# Patient Record
Sex: MALE | Race: WHITE | NOT HISPANIC OR LATINO | Employment: FULL TIME | URBAN - METROPOLITAN AREA
[De-identification: names, ages, dates, MRNs, and addresses within clinical notes are randomized per-mention and may not be internally consistent; named-entity substitution may affect disease eponyms.]

---

## 2017-01-24 ENCOUNTER — OFFICE VISIT (OUTPATIENT)
Dept: INTERNAL MEDICINE | Facility: CLINIC | Age: 32
End: 2017-01-24

## 2017-01-24 VITALS
HEART RATE: 83 BPM | SYSTOLIC BLOOD PRESSURE: 128 MMHG | OXYGEN SATURATION: 98 % | WEIGHT: 271.2 LBS | TEMPERATURE: 97.5 F | DIASTOLIC BLOOD PRESSURE: 80 MMHG | HEIGHT: 75 IN | BODY MASS INDEX: 33.72 KG/M2

## 2017-01-24 DIAGNOSIS — J06.9 URI WITH COUGH AND CONGESTION: Primary | ICD-10-CM

## 2017-01-24 DIAGNOSIS — H65.93 MIDDLE EAR EFFUSION, BILATERAL: ICD-10-CM

## 2017-01-24 PROBLEM — R79.89 ABNORMAL LFTS: Status: ACTIVE | Noted: 2017-01-24

## 2017-01-24 PROBLEM — R53.83 FATIGUE: Status: ACTIVE | Noted: 2017-01-24

## 2017-01-24 PROBLEM — M13.0 INFLAMMATION OF MULTIPLE JOINTS: Status: ACTIVE | Noted: 2017-01-24

## 2017-01-24 PROBLEM — K62.5 BLEEDING PER RECTUM: Status: ACTIVE | Noted: 2017-01-24

## 2017-01-24 PROBLEM — M79.673 FOOT PAIN: Status: ACTIVE | Noted: 2017-01-24

## 2017-01-24 PROBLEM — M10.9 GOUT: Status: ACTIVE | Noted: 2017-01-24

## 2017-01-24 PROBLEM — M25.60 JOINT STIFFNESS: Status: ACTIVE | Noted: 2017-01-24

## 2017-01-24 PROBLEM — M25.569 GONALGIA: Status: ACTIVE | Noted: 2017-01-24

## 2017-01-24 PROBLEM — M79.10 MUSCLE ACHE: Status: ACTIVE | Noted: 2017-01-24

## 2017-01-24 PROBLEM — M72.2 PLANTAR FASCIITIS: Status: ACTIVE | Noted: 2017-01-24

## 2017-01-24 PROCEDURE — 99213 OFFICE O/P EST LOW 20 MIN: CPT | Performed by: INTERNAL MEDICINE

## 2017-01-24 RX ORDER — NAPROXEN 500 MG/1
TABLET ORAL
COMMUNITY
Start: 2015-04-17 | End: 2017-01-24

## 2017-01-24 RX ORDER — FOLIC ACID 1 MG/1
TABLET ORAL DAILY
COMMUNITY
Start: 2015-08-05 | End: 2017-01-24

## 2017-01-24 RX ORDER — ALLOPURINOL 300 MG/1
TABLET ORAL 2 TIMES DAILY
COMMUNITY
Start: 2015-04-17 | End: 2017-01-24

## 2017-01-24 NOTE — PROGRESS NOTES
"Subjective     Andrew Torerz is a 31 y.o. male, who presents with a chief complaint of   Chief Complaint   Patient presents with   • Sinusitis     all sympt started on saturday \"green, clear and some red mucus, drainage, it is killing my stomach\"   • Nasal Congestion       HPI Comments: Patient started having cough and congestion starting Saturday that is getting worse. He states that he took some Mucinex and it did not help and he is nauseous from drainage. No fevers or chills. He has yellow phlegm from his nose and chest. No CP or SOB. No one has been sick around. His ears are also hurting on both sides and feel \"clogged up\".  No sore throat. Has not tried anything that helps and nothing seems to make it better. He is remaining hydrated.        The following portions of the patient's history were reviewed and updated as appropriate: allergies, current medications, past family history, past medical history, past social history, past surgical history and problem list.    Allergies: Review of patient's allergies indicates no known allergies.  No current outpatient prescriptions on file.  Medications Discontinued During This Encounter   Medication Reason   • allopurinol (ZYLOPRIM) 300 MG tablet    • folic acid (FOLVITE) 1 MG tablet    • methotrexate (RHEUMATREX) 2.5 MG tablet    • naproxen (NAPROSYN) 500 MG tablet        Review of Systems   Constitutional: Positive for activity change and fatigue. Negative for appetite change, chills and fever.   HENT: Positive for congestion, ear pain and postnasal drip.    Eyes: Negative for pain, discharge and itching.   Respiratory: Positive for cough. Negative for shortness of breath.    Cardiovascular: Negative for chest pain and palpitations.   Gastrointestinal: Positive for abdominal pain and nausea. Negative for vomiting.   Neurological: Positive for headaches. Negative for numbness.   Hematological: Negative for adenopathy.       Objective     Visit Vitals   • /80 " "(BP Location: Left arm, Patient Position: Sitting, Cuff Size: Adult)   • Pulse 83   • Temp 97.5 °F (36.4 °C) (Oral)   • Ht 75\" (190.5 cm)   • Wt 271 lb 3.2 oz (123 kg)   • SpO2 98%   • BMI 33.9 kg/m2         Physical Exam   Constitutional: He is oriented to person, place, and time. He appears well-developed and well-nourished. No distress.   HENT:   Head: Normocephalic and atraumatic.   Right Ear: External ear normal. A middle ear effusion is present.   Left Ear: External ear normal. A middle ear effusion is present.   Mouth/Throat: Mucous membranes are normal. Posterior oropharyngeal edema and posterior oropharyngeal erythema present. No oropharyngeal exudate.   Eyes: Conjunctivae are normal. Right eye exhibits no discharge. Left eye exhibits no discharge. No scleral icterus.   Neck: Neck supple.   Cardiovascular: Normal rate, regular rhythm and normal heart sounds.  Exam reveals no gallop and no friction rub.    No murmur heard.  Pulmonary/Chest: Effort normal and breath sounds normal. No respiratory distress. He has no wheezes. He has no rales.   Abdominal: Soft. Bowel sounds are normal. He exhibits no distension and no mass. There is tenderness in the epigastric area. There is no guarding.   Lymphadenopathy:     He has no cervical adenopathy.   Neurological: He is alert and oriented to person, place, and time.   Skin: Skin is warm. No rash noted.   Psychiatric: He has a normal mood and affect. His behavior is normal.   Nursing note and vitals reviewed.        Results for orders placed or performed during the hospital encounter of 05/21/15   Alpha-1-antitrypsin   Result Value Ref Range    A-1 Antitrypsin 158 90 - 200 mg/dL   Celiac comprehensive panel   Result Value Ref Range    IgA 270 91 - 414 mg/dL    Tissue Transglutaminase IgA <2 0 - 3 U/mL    Tissue Transglutaminase IgG 2 0 - 5 U/mL    Endomysial IgA Negative NEGATIVE    Gliadin Deamidated Peptide Ab, IgA 7 0 - 19 units    Deaminated Gliadin Ab IgG 3 0 - 19 " units   Ferritin   Result Value Ref Range    Ferritin 211.3 30.0 - 400.0 ng/mL   IgG, IgA, IgM   Result Value Ref Range    IgA 244 70 - 400 mg/dL    IgM 96 40 - 230 mg/dL    IgG 1424 700 - 1600 mg/dL   Mitochondrial antibodies, M2   Result Value Ref Range    Mitochondrial Ab <20.0 0.0 - 20.0 Units   Anti-smooth muscle antibody, IgG   Result Value Ref Range    Smooth Muscle Ab see result below        Assessment/Plan   Andrew was seen today for sinusitis and nasal congestion.    Diagnoses and all orders for this visit:    URI with cough and congestion    Middle ear effusion, bilateral    Drink plenty of fluids   Take Allegra D daily for drainage and ear pain   Take Mucinex every 12 hours for cough and secretions   Take tylenol or ibuprofen for joint pains, fever or pain in the ears  Call on Monday if not feeling better      Return if symptoms worsen or fail to improve.    Ivory Claros MD  01/24/2017

## 2017-01-24 NOTE — PATIENT INSTRUCTIONS
Drink plenty of fluids   Take Allegra D daily for drainage and ear pain   Take Mucinex every 12 hours for cough and secretions   Take tylenol or ibuprofen for joint pains, fever or pain in the ears

## 2017-03-14 ENCOUNTER — TELEPHONE (OUTPATIENT)
Dept: INTERNAL MEDICINE | Facility: CLINIC | Age: 32
End: 2017-03-14

## 2017-03-14 RX ORDER — NAPROXEN 500 MG/1
500 TABLET ORAL 2 TIMES DAILY WITH MEALS
Qty: 60 TABLET | Refills: 0 | Status: SHIPPED | OUTPATIENT
Start: 2017-03-14

## 2017-03-14 NOTE — TELEPHONE ENCOUNTER
----- Message from Bernie Pollard MA sent at 3/14/2017  8:37 AM EDT -----  PT IS HAVING ARTHRITIS ISSUES,  WANTS TO KNOW IF YOU WILL REFILL HIS NAPROXEN  500MG   1 PO BID,  JOINT PAIN, SWELLING,  ANKLE KNEE WRIST.     RIMMA HUANG  [phone]      Per dr isael brito to sent in naprosyn  [facility] aware

## 2019-02-18 ENCOUNTER — TELEPHONE (OUTPATIENT)
Dept: INTERNAL MEDICINE | Facility: CLINIC | Age: 34
End: 2019-02-18

## 2019-02-18 RX ORDER — OSELTAMIVIR PHOSPHATE 75 MG/1
75 CAPSULE ORAL 2 TIMES DAILY
Qty: 10 CAPSULE | Refills: 0 | Status: SHIPPED | OUTPATIENT
Start: 2019-02-18

## 2019-02-18 NOTE — TELEPHONE ENCOUNTER
Sent to pharmacy    Left message for   mother Message from Rolanda Kelley MD sent at 2/18/2019  3:40 PM EST -----  Regarding: RE: TAMIFLU REQUEST  Contact: [phone]  Ok for tamifly 75mg po bid x 5 days    ----- Message -----  From: Bernie Pollard MA  Sent: 2/18/2019   9:10 AM  To: Rolanda Kelley MD  Subject: FW: TAMIFLU REQUEST                                  ----- Message -----  From: Jean Bui  Sent: 2/18/2019   8:50 AM  To: [facility]  Subject: TAMIFLU REQUEST                                  RUFUS PT    Mom called to say that the patient's girlfriend was diagnosed with positive Flu A. He is showing all of the symptoms of the flu, and she is asking for tamiflu to be called in to [facility] in [place].    Thanks!  jean